# Patient Record
Sex: MALE | Race: BLACK OR AFRICAN AMERICAN | NOT HISPANIC OR LATINO | URBAN - METROPOLITAN AREA
[De-identification: names, ages, dates, MRNs, and addresses within clinical notes are randomized per-mention and may not be internally consistent; named-entity substitution may affect disease eponyms.]

---

## 2021-04-20 ENCOUNTER — EMERGENCY (EMERGENCY)
Facility: HOSPITAL | Age: 46
LOS: 1 days | Discharge: HOME | End: 2021-04-20
Attending: EMERGENCY MEDICINE | Admitting: EMERGENCY MEDICINE
Payer: SELF-PAY

## 2021-04-20 VITALS
OXYGEN SATURATION: 100 % | DIASTOLIC BLOOD PRESSURE: 68 MMHG | RESPIRATION RATE: 16 BRPM | TEMPERATURE: 98 F | HEART RATE: 62 BPM | SYSTOLIC BLOOD PRESSURE: 115 MMHG

## 2021-04-20 VITALS
TEMPERATURE: 98 F | HEART RATE: 54 BPM | HEIGHT: 73 IN | DIASTOLIC BLOOD PRESSURE: 85 MMHG | SYSTOLIC BLOOD PRESSURE: 133 MMHG | RESPIRATION RATE: 16 BRPM | WEIGHT: 139.99 LBS | OXYGEN SATURATION: 100 %

## 2021-04-20 DIAGNOSIS — R11.2 NAUSEA WITH VOMITING, UNSPECIFIED: ICD-10-CM

## 2021-04-20 DIAGNOSIS — R10.9 UNSPECIFIED ABDOMINAL PAIN: ICD-10-CM

## 2021-04-20 LAB
ALBUMIN SERPL ELPH-MCNC: 4.6 G/DL — SIGNIFICANT CHANGE UP (ref 3.3–5)
ALP SERPL-CCNC: 83 U/L — SIGNIFICANT CHANGE UP (ref 40–120)
ALT FLD-CCNC: 19 U/L — SIGNIFICANT CHANGE UP (ref 10–45)
ANION GAP SERPL CALC-SCNC: 18 MMOL/L — HIGH (ref 5–17)
AST SERPL-CCNC: 28 U/L — SIGNIFICANT CHANGE UP (ref 10–40)
BASOPHILS # BLD AUTO: 0.05 K/UL — SIGNIFICANT CHANGE UP (ref 0–0.2)
BASOPHILS NFR BLD AUTO: 0.6 % — SIGNIFICANT CHANGE UP (ref 0–2)
BILIRUB SERPL-MCNC: 0.5 MG/DL — SIGNIFICANT CHANGE UP (ref 0.2–1.2)
BUN SERPL-MCNC: 12 MG/DL — SIGNIFICANT CHANGE UP (ref 7–23)
CALCIUM SERPL-MCNC: 10.2 MG/DL — SIGNIFICANT CHANGE UP (ref 8.4–10.5)
CHLORIDE SERPL-SCNC: 100 MMOL/L — SIGNIFICANT CHANGE UP (ref 96–108)
CO2 SERPL-SCNC: 21 MMOL/L — LOW (ref 22–31)
CREAT SERPL-MCNC: 0.86 MG/DL — SIGNIFICANT CHANGE UP (ref 0.5–1.3)
EOSINOPHIL # BLD AUTO: 0 K/UL — SIGNIFICANT CHANGE UP (ref 0–0.5)
EOSINOPHIL NFR BLD AUTO: 0 % — SIGNIFICANT CHANGE UP (ref 0–6)
GLUCOSE SERPL-MCNC: 130 MG/DL — HIGH (ref 70–99)
HCT VFR BLD CALC: 47.4 % — SIGNIFICANT CHANGE UP (ref 39–50)
HGB BLD-MCNC: 15.9 G/DL — SIGNIFICANT CHANGE UP (ref 13–17)
IMM GRANULOCYTES NFR BLD AUTO: 0.4 % — SIGNIFICANT CHANGE UP (ref 0–1.5)
LIDOCAIN IGE QN: 72 U/L — HIGH (ref 7–60)
LYMPHOCYTES # BLD AUTO: 1 K/UL — SIGNIFICANT CHANGE UP (ref 1–3.3)
LYMPHOCYTES # BLD AUTO: 11.8 % — LOW (ref 13–44)
MCHC RBC-ENTMCNC: 29 PG — SIGNIFICANT CHANGE UP (ref 27–34)
MCHC RBC-ENTMCNC: 33.5 GM/DL — SIGNIFICANT CHANGE UP (ref 32–36)
MCV RBC AUTO: 86.3 FL — SIGNIFICANT CHANGE UP (ref 80–100)
MONOCYTES # BLD AUTO: 0.14 K/UL — SIGNIFICANT CHANGE UP (ref 0–0.9)
MONOCYTES NFR BLD AUTO: 1.7 % — LOW (ref 2–14)
NEUTROPHILS # BLD AUTO: 7.26 K/UL — SIGNIFICANT CHANGE UP (ref 1.8–7.4)
NEUTROPHILS NFR BLD AUTO: 85.5 % — HIGH (ref 43–77)
NRBC # BLD: 0 /100 WBCS — SIGNIFICANT CHANGE UP (ref 0–0)
PLATELET # BLD AUTO: 250 K/UL — SIGNIFICANT CHANGE UP (ref 150–400)
POTASSIUM SERPL-MCNC: 4.4 MMOL/L — SIGNIFICANT CHANGE UP (ref 3.5–5.3)
POTASSIUM SERPL-SCNC: 4.4 MMOL/L — SIGNIFICANT CHANGE UP (ref 3.5–5.3)
PROT SERPL-MCNC: 8.4 G/DL — HIGH (ref 6–8.3)
RBC # BLD: 5.49 M/UL — SIGNIFICANT CHANGE UP (ref 4.2–5.8)
RBC # FLD: 15 % — HIGH (ref 10.3–14.5)
SODIUM SERPL-SCNC: 139 MMOL/L — SIGNIFICANT CHANGE UP (ref 135–145)
WBC # BLD: 8.48 K/UL — SIGNIFICANT CHANGE UP (ref 3.8–10.5)
WBC # FLD AUTO: 8.48 K/UL — SIGNIFICANT CHANGE UP (ref 3.8–10.5)

## 2021-04-20 PROCEDURE — 96375 TX/PRO/DX INJ NEW DRUG ADDON: CPT

## 2021-04-20 PROCEDURE — 83690 ASSAY OF LIPASE: CPT

## 2021-04-20 PROCEDURE — 99284 EMERGENCY DEPT VISIT MOD MDM: CPT | Mod: 25

## 2021-04-20 PROCEDURE — 85025 COMPLETE CBC W/AUTO DIFF WBC: CPT

## 2021-04-20 PROCEDURE — 96374 THER/PROPH/DIAG INJ IV PUSH: CPT

## 2021-04-20 PROCEDURE — 80053 COMPREHEN METABOLIC PANEL: CPT

## 2021-04-20 PROCEDURE — 36415 COLL VENOUS BLD VENIPUNCTURE: CPT

## 2021-04-20 PROCEDURE — 99284 EMERGENCY DEPT VISIT MOD MDM: CPT

## 2021-04-20 RX ORDER — ONDANSETRON 8 MG/1
8 TABLET, FILM COATED ORAL ONCE
Refills: 0 | Status: COMPLETED | OUTPATIENT
Start: 2021-04-20 | End: 2021-04-20

## 2021-04-20 RX ORDER — SODIUM CHLORIDE 9 MG/ML
1000 INJECTION INTRAMUSCULAR; INTRAVENOUS; SUBCUTANEOUS ONCE
Refills: 0 | Status: COMPLETED | OUTPATIENT
Start: 2021-04-20 | End: 2021-04-20

## 2021-04-20 RX ORDER — METOCLOPRAMIDE HCL 10 MG
10 TABLET ORAL ONCE
Refills: 0 | Status: COMPLETED | OUTPATIENT
Start: 2021-04-20 | End: 2021-04-20

## 2021-04-20 RX ORDER — DIPHENHYDRAMINE HCL 50 MG
25 CAPSULE ORAL ONCE
Refills: 0 | Status: COMPLETED | OUTPATIENT
Start: 2021-04-20 | End: 2021-04-20

## 2021-04-20 RX ADMIN — Medication 104 MILLIGRAM(S): at 15:42

## 2021-04-20 RX ADMIN — ONDANSETRON 8 MILLIGRAM(S): 8 TABLET, FILM COATED ORAL at 18:21

## 2021-04-20 RX ADMIN — SODIUM CHLORIDE 1000 MILLILITER(S): 9 INJECTION INTRAMUSCULAR; INTRAVENOUS; SUBCUTANEOUS at 15:42

## 2021-04-20 RX ADMIN — Medication 25 MILLIGRAM(S): at 15:42

## 2021-04-20 NOTE — ED ADULT TRIAGE NOTE - CHIEF COMPLAINT QUOTE
Pt BIBA from work CO generalized Abd Pain with N/V starting this morning.  Pt states "It's not focused at one point its just all over."  Pt teary eyed during triage.  Denies SOB, Fevers, CP and Dizziness.

## 2021-04-20 NOTE — ED ADULT NURSE NOTE - OBJECTIVE STATEMENT
45 y/o male received into the ED, stating that he is having "uneasiness" in his abdomen.  Pt. states that the pain is "very little" but he feels very nauseous.  Abdomen is soft and non tender.  Last vomiting episode was prior to ed arrival.

## 2021-04-20 NOTE — ED PROVIDER NOTE - CLINICAL SUMMARY MEDICAL DECISION MAKING FREE TEXT BOX
n/v - possibly 2' to thc- hydration, antiemetics, serial abd exams n/v - possibly 2' to thc- hydration, antiemetics, serial abd exams  feeling better, wants to go home and sleep- counseled re THC use

## 2021-04-20 NOTE — ED PROVIDER NOTE - NSFOLLOWUPINSTRUCTIONS_ED_ALL_ED_FT
Cannabinoid Hyperemesis Syndrome      Cannabinoid hyperemesis syndrome (CHS) is a condition that causes repeated nausea, vomiting, and abdominal pain after long-term (chronic) use of marijuana (cannabis). People with CHS typically use marijuana 3–5 times a day for many years before they have symptoms, although it is possible to develop CHS with far less daily use.    Symptoms of CHS may be mild at first but can get worse and more frequent. In some cases, CHS may cause severe daily vomiting, which can lead to weight loss and dehydration.      What are the causes?    The exact cause of this condition is not known. Long-term use of marijuana may over-stimulate certain proteins in the brain and gastrointestinal tract that react with chemicals in marijuana (cannabinoid receptors). This over-stimulation may cause CHS.      What are the signs or symptoms?  Symptoms of this condition are often mild during the first few episodes, but they can get worse over time. Symptoms may include:  •Frequent nausea, especially early in the morning.      •Vomiting. This can become severe.      •Abdominal pain.      •Feeling very tired (lethargic).      •Headaches.      CHS may go away and come back many times (recur). People may not have symptoms or may otherwise be healthy in between CHS episodes. Taking hot showers can relieve the symptoms of CHS, so feeling the need to take several hot showers throughout the day can be a sign of this condition.      How is this diagnosed?  This condition may be diagnosed based on:  •Your symptoms and medical history, including any drug use.      •A physical exam.    You may have tests done to rule out other problems that could cause your symptoms. These tests may include:  •Blood tests.      •Urine tests.      •Imaging tests, such as an X-ray or a CT scan.        How is this treated?  Treatment for this condition involves stopping marijuana use. Treatment may include:  •A drug rehabilitation program, if you have trouble stopping marijuana use.      •Medicines for nausea. These may be given at the hospital through an IV inserted into one of your veins, or they may be medicines that you take by mouth (orally).      •Certain creams that contain a substance called capsaicin. These may improve symptoms when applied to the abdomen.      •Hot showers to help relieve symptoms.      In severe cases, you may need treatment at a hospital. You may be given IV fluids to prevent or treat dehydration as well as medicines to treat nausea, vomiting, and pain.      Follow these instructions at home:      During an episode of CHS      •Stay in bed and rest in a dark, quiet room.      •Take anti-nausea medicine as told by your health care provider.      •Try taking hot showers to relieve your symptoms.      After an episode of CHS     •Drink small amounts of clear fluids slowly. Gradually add more if you can keep the fluids down without vomiting.      •Once you are able to eat without vomiting, eat soft foods in small amounts every 3–4 hours.        General instructions      • Do not use any products that contain marijuana.If you need help quitting, ask your health care provider for resources and treatment options.      •Drink enough fluid to keep your urine pale yellow. Avoid drinking fluids that have a lot of sugar or caffeine, such as coffee and soda.      •Take and apply over-the-counter and prescription medicines only as told by your health care provider. Ask your health care provider before starting any new medicines or treatments.      •Keep all follow-up visits as told by your health care provider. This is important. This includes any recommended substance abuse programs.        Contact a health care provider if:    •Your symptoms get worse.      •You cannot drink fluids without vomiting or severe pain.      •You have pain and trouble swallowing after an episode.        Get help right away if you:    •Cannot stop vomiting.      •Have blood in your vomit or your vomit looks like coffee grounds.      •Have severe abdominal pain.      •Have stools that are bloody or black, or stools that look like tar.    •Have symptoms of dehydration, such as:  •Sunken eyes.      •Inability to make tears.      •Cracked lips.      •Dry mouth.      •Decreased urine production.      •Weakness.      •Sleepiness.      •Dizziness, light-headedness, or fainting.          Summary    •Cannabinoid hyperemesis syndrome (CHS) is a condition that causes repeated nausea, vomiting, and abdominal pain after long-term use of marijuana.      •People with CHS typically use marijuana 3–5 times a day for many years before they have symptoms, although it is possible to develop CHS with much less daily use.      •Treatment for this condition involves stopping marijuana use. Hot showers and capsaicin creams may also help relieve symptoms. Ask your health care provider before starting any medicines or other treatments.      •Your health care provider may prescribe medicines to help with nausea.      •Get help right away if you have signs of dehydration, such as dry mouth, decreased urine production, weakness, dizziness, and light-headedness.      This information is not intended to replace advice given to you by your health care provider. Make sure you discuss any questions you have with your health care provider.      Document Revised: 10/14/2020 Document Reviewed: 10/14/2020    [employer] Patient Education © 2021 [employer]

## 2021-04-20 NOTE — ED PROVIDER NOTE - PATIENT PORTAL LINK FT
You can access the FollowMyHealth Patient Portal offered by [facility] by registering at the following website: [email or web address]. By joining [facility]’s FollowMyHealth portal, you will also be able to view your health information using other applications (apps) compatible with our system.

## 2021-04-20 NOTE — ED PROVIDER NOTE - OBJECTIVE STATEMENT
46 M w hyperemesis - possibly 2' to THC - awoke this am w nausea, vomited several times- nonbilious nonbloody  px thc smoker 3 joints per day on avg- last smoked yesterday- ate night had snack- cookie, soda, chips  no f/c no prior abd surgeries  no abd pain  moderate- severe